# Patient Record
Sex: MALE | Race: WHITE | NOT HISPANIC OR LATINO | Employment: OTHER | URBAN - METROPOLITAN AREA
[De-identification: names, ages, dates, MRNs, and addresses within clinical notes are randomized per-mention and may not be internally consistent; named-entity substitution may affect disease eponyms.]

---

## 2018-11-30 ENCOUNTER — OFFICE VISIT (OUTPATIENT)
Dept: FAMILY MEDICINE CLINIC | Facility: CLINIC | Age: 73
End: 2018-11-30

## 2018-11-30 VITALS
HEIGHT: 68 IN | OXYGEN SATURATION: 94 % | WEIGHT: 156.7 LBS | RESPIRATION RATE: 20 BRPM | BODY MASS INDEX: 23.75 KG/M2 | TEMPERATURE: 97.8 F | HEART RATE: 99 BPM | DIASTOLIC BLOOD PRESSURE: 86 MMHG | SYSTOLIC BLOOD PRESSURE: 158 MMHG

## 2018-11-30 DIAGNOSIS — Z23 NEED FOR VACCINATION: Primary | ICD-10-CM

## 2018-11-30 DIAGNOSIS — I82.501 CHRONIC DEEP VEIN THROMBOSIS (DVT) OF RIGHT LOWER EXTREMITY, UNSPECIFIED VEIN (HCC): ICD-10-CM

## 2018-11-30 DIAGNOSIS — I10 ESSENTIAL HYPERTENSION: ICD-10-CM

## 2018-11-30 DIAGNOSIS — E78.5 HYPERLIPIDEMIA, UNSPECIFIED HYPERLIPIDEMIA TYPE: ICD-10-CM

## 2018-11-30 PROCEDURE — 90662 IIV NO PRSV INCREASED AG IM: CPT | Performed by: NURSE PRACTITIONER

## 2018-11-30 PROCEDURE — 99204 OFFICE O/P NEW MOD 45 MIN: CPT | Performed by: NURSE PRACTITIONER

## 2018-11-30 PROCEDURE — G0008 ADMIN INFLUENZA VIRUS VAC: HCPCS | Performed by: NURSE PRACTITIONER

## 2018-11-30 RX ORDER — SIMVASTATIN 10 MG
10 TABLET ORAL DAILY
Qty: 90 TABLET | Refills: 1 | Status: SHIPPED | OUTPATIENT
Start: 2018-11-30

## 2018-11-30 RX ORDER — RIVAROXABAN 20 MG/1
TABLET, FILM COATED ORAL
COMMUNITY
Start: 2018-09-23 | End: 2018-11-30 | Stop reason: SDUPTHER

## 2018-11-30 RX ORDER — SIMVASTATIN 10 MG
10 TABLET ORAL DAILY
COMMUNITY
End: 2018-11-30 | Stop reason: SDUPTHER

## 2018-11-30 RX ORDER — FLUTICASONE PROPIONATE 50 MCG
SPRAY, SUSPENSION (ML) NASAL
COMMUNITY
Start: 2018-11-16

## 2018-11-30 RX ORDER — PREDNISONE 10 MG/1
TABLET ORAL
COMMUNITY
Start: 2018-11-20 | End: 2018-11-30

## 2018-11-30 RX ORDER — RIVAROXABAN 20 MG/1
20 TABLET, FILM COATED ORAL
Qty: 90 TABLET | Refills: 1 | Status: SHIPPED | OUTPATIENT
Start: 2018-11-30

## 2018-11-30 RX ORDER — DOXYCYCLINE 100 MG/1
TABLET ORAL
COMMUNITY
Start: 2018-11-16 | End: 2018-11-30

## 2018-11-30 RX ORDER — AMLODIPINE BESYLATE AND BENAZEPRIL HYDROCHLORIDE 5; 20 MG/1; MG/1
1 CAPSULE ORAL DAILY
Qty: 90 CAPSULE | Refills: 1 | Status: SHIPPED | OUTPATIENT
Start: 2018-11-30

## 2018-11-30 RX ORDER — AMLODIPINE BESYLATE AND BENAZEPRIL HYDROCHLORIDE 5; 20 MG/1; MG/1
CAPSULE ORAL
COMMUNITY
Start: 2018-09-17 | End: 2018-11-30 | Stop reason: SDUPTHER

## 2018-11-30 NOTE — PROGRESS NOTES
Subjective   Alexey Echols is a 73 y.o. male.     Chief Complaint   Patient presents with   • Hyperlipidemia     NP, needs medication refills   • Hypertension   • Deep Vein Thrombosis     Mr. Echols presents today to establish care at this practice. He has a history of asthma, hypertension, hyperlipidemia and DVT (since 2017). He is currently taking Lotrel, Advair, Simvastatin, and Xarelto. He will need his medications refilled. He has no concerns today.     I have reviewed the patient's medical history in detail and updated the computerized patient record.    The following portions of the patient's history were reviewed and updated as appropriate: allergies, current medications, past family history, past medical history, past social history, past surgical history and problem list.       Current Outpatient Medications:   •  amLODIPine-benazepril (LOTREL 5-20) 5-20 MG per capsule, Take 1 capsule by mouth Daily., Disp: 90 capsule, Rfl: 1  •  Cholecalciferol (VITAMIN D) 1000 units tablet, Take 2,000 Units by mouth Daily., Disp: , Rfl:   •  fluticasone (FLONASE) 50 MCG/ACT nasal spray, , Disp: , Rfl:   •  fluticasone-salmeterol (ADVAIR DISKUS) 250-50 MCG/DOSE DISKUS, Inhale 1 puff 2 (Two) Times a Day., Disp: 120 each, Rfl: 1  •  simvastatin (ZOCOR) 10 MG tablet, Take 1 tablet by mouth Daily., Disp: 90 tablet, Rfl: 1  •  XARELTO 20 MG tablet, Take 1 tablet by mouth Daily With Dinner., Disp: 90 tablet, Rfl: 1    Review of Systems   Constitutional: Negative.    Respiratory: Negative.    Cardiovascular: Negative.    Musculoskeletal: Negative.    Skin: Negative.    Neurological: Negative.    All other systems reviewed and are negative.      Objective    Vitals:    11/30/18 1309   BP: 158/86   Pulse: 99   Resp: 20   Temp: 97.8 °F (36.6 °C)   SpO2: 94%     Physical Exam   Constitutional: He is oriented to person, place, and time. He appears well-developed and well-nourished.   HENT:   Head: Normocephalic.   Right Ear:  External ear normal.   Left Ear: External ear normal.   Mouth/Throat: Oropharynx is clear and moist.   Neck: Normal range of motion. Neck supple. No tracheal deviation present. No thyromegaly present.   Cardiovascular: Normal rate, regular rhythm, normal heart sounds and intact distal pulses.   Pulmonary/Chest: Effort normal and breath sounds normal.   Musculoskeletal: Normal range of motion. He exhibits no edema.   Lymphadenopathy:     He has no cervical adenopathy.   Neurological: He is alert and oriented to person, place, and time.   Skin: Skin is warm and dry.   Psychiatric:   No acute distress   Vitals reviewed.        Assessment/Plan   Alexey was seen today for hyperlipidemia, hypertension and deep vein thrombosis.    Diagnoses and all orders for this visit:    Need for vaccination  -     Flu Vaccine High Dose PF 65YR+ ([id number])    Essential hypertension    Chronic deep vein thrombosis (DVT) of right lower extremity, unspecified vein (CMS/HCC)    Hyperlipidemia, unspecified hyperlipidemia type    Other orders  -     XARELTO 20 MG tablet; Take 1 tablet by mouth Daily With Dinner.  -     amLODIPine-benazepril (LOTREL 5-20) 5-20 MG per capsule; Take 1 capsule by mouth Daily.  -     fluticasone-salmeterol (ADVAIR DISKUS) 250-50 MCG/DOSE DISKUS; Inhale 1 puff 2 (Two) Times a Day.  -     simvastatin (ZOCOR) 10 MG tablet; Take 1 tablet by mouth Daily.    1. He is to continue all medications as prescribed. I have reordered his medications for him today.  2. I have asked for his medical records from his previous PCP. I will defer from doing labs at this time until I am able to review his records.  3. Follow up as needed and in January for FRANCIE.

## 2018-12-04 RX ORDER — ALBUTEROL SULFATE 90 UG/1
2 AEROSOL, METERED RESPIRATORY (INHALATION) EVERY 4 HOURS PRN
Qty: 3 INHALER | Refills: 1 | Status: SHIPPED | OUTPATIENT
Start: 2018-12-04

## 2018-12-14 ENCOUNTER — OFFICE VISIT (OUTPATIENT)
Dept: FAMILY MEDICINE CLINIC | Facility: CLINIC | Age: 73
End: 2018-12-14

## 2018-12-14 VITALS
DIASTOLIC BLOOD PRESSURE: 86 MMHG | HEIGHT: 68 IN | HEART RATE: 106 BPM | TEMPERATURE: 98 F | BODY MASS INDEX: 23.64 KG/M2 | WEIGHT: 156 LBS | RESPIRATION RATE: 20 BRPM | OXYGEN SATURATION: 90 % | SYSTOLIC BLOOD PRESSURE: 130 MMHG

## 2018-12-14 DIAGNOSIS — J45.909 MODERATE ASTHMA WITHOUT COMPLICATION, UNSPECIFIED WHETHER PERSISTENT: Primary | ICD-10-CM

## 2018-12-14 PROCEDURE — 99214 OFFICE O/P EST MOD 30 MIN: CPT | Performed by: NURSE PRACTITIONER

## 2018-12-14 RX ORDER — METHYLPREDNISOLONE SODIUM SUCCINATE 40 MG/ML
40 INJECTION, POWDER, LYOPHILIZED, FOR SOLUTION INTRAMUSCULAR; INTRAVENOUS ONCE
Status: DISCONTINUED | OUTPATIENT
Start: 2018-12-14 | End: 2019-01-14

## 2018-12-14 RX ORDER — PREDNISONE 20 MG/1
20 TABLET ORAL 2 TIMES DAILY
Qty: 10 TABLET | Refills: 0 | Status: SHIPPED | OUTPATIENT
Start: 2018-12-14 | End: 2018-12-19

## 2018-12-14 RX ORDER — ERYTHROMYCIN 500 MG/1
500 TABLET, COATED ORAL 2 TIMES DAILY
Qty: 14 TABLET | Refills: 0 | Status: SHIPPED | OUTPATIENT
Start: 2018-12-14 | End: 2019-01-21

## 2018-12-14 NOTE — PROGRESS NOTES
Subjective   Alexey Echols is a 73 y.o. male.     Chief Complaint   Patient presents with   • Shortness of Breath     consistant      Shortness of Breath   This is a recurrent problem. The current episode started 1 to 4 weeks ago. The problem occurs constantly. The problem has been waxing and waning. Associated symptoms include rhinorrhea, sputum production and wheezing. Pertinent negatives include no fever, leg pain or leg swelling. Nothing aggravates the symptoms. Associated symptoms comments: cough. Risk factors: history of DVT. He has tried leukotriene antagonists and ipratropium inhalers for the symptoms. The treatment provided moderate relief. His past medical history is significant for asthma and DVT. There is no history of PE.    I have reviewed the patient's medical history in detail and updated the computerized patient record.    The following portions of the patient's history were reviewed and updated as appropriate: allergies, current medications, past family history, past medical history, past social history, past surgical history and problem list.       Current Outpatient Medications:   •  albuterol 108 (90 Base) MCG/ACT inhaler, Inhale 2 puffs Every 4 (Four) Hours As Needed for Wheezing., Disp: 3 inhaler, Rfl: 1  •  amLODIPine-benazepril (LOTREL 5-20) 5-20 MG per capsule, Take 1 capsule by mouth Daily., Disp: 90 capsule, Rfl: 1  •  Cholecalciferol (VITAMIN D) 1000 units tablet, Take 2,000 Units by mouth Daily., Disp: , Rfl:   •  fluticasone (FLONASE) 50 MCG/ACT nasal spray, , Disp: , Rfl:   •  fluticasone-salmeterol (ADVAIR DISKUS) 250-50 MCG/DOSE DISKUS, Inhale 1 puff 2 (Two) Times a Day., Disp: 120 each, Rfl: 1  •  simvastatin (ZOCOR) 10 MG tablet, Take 1 tablet by mouth Daily., Disp: 90 tablet, Rfl: 1  •  XARELTO 20 MG tablet, Take 1 tablet by mouth Daily With Dinner., Disp: 90 tablet, Rfl: 1    Review of Systems   Constitutional: Negative.  Negative for chills and fever.   HENT: Positive for  "rhinorrhea.    Respiratory: Positive for cough, sputum production, chest tightness (at times), shortness of breath and wheezing.    Cardiovascular: Negative.  Negative for leg swelling.   Neurological: Negative.         Vitals:    12/14/18 1343   BP: 130/86   BP Location: Left arm   Patient Position: Sitting   Cuff Size: Adult   Pulse: 106   Resp: 20   Temp: 98 °F (36.7 °C)   TempSrc: Oral   SpO2: 90%   Weight: 70.8 kg (156 lb)   Height: 171.5 cm (67.52\")       Objective   Physical Exam   Constitutional: He is oriented to person, place, and time. He appears well-developed and well-nourished.   Cardiovascular: Normal rate, regular rhythm, normal heart sounds and intact distal pulses.   Pulmonary/Chest: Effort normal. No respiratory distress. He has wheezes in the right upper field, the right middle field, the right lower field, the left upper field, the left middle field and the left lower field. He has no rales.   Neurological: He is alert and oriented to person, place, and time.   Skin: Skin is warm and dry.   Psychiatric:   No acute distress   Vitals reviewed.        Assessment/Plan   Alexey was seen today for shortness of breath.    Diagnoses and all orders for this visit:    Moderate asthma without complication, unspecified whether persistent  -     methylPREDNISolone sodium succinate (SOLU-Medrol) injection 40 mg; Inject 1 mL into the appropriate muscle as directed by prescriber 1 (One) Time.    Other orders  -     predniSONE (DELTASONE) 20 MG tablet; Take 1 tablet by mouth 2 (Two) Times a Day for 5 days.  -     erythromycin base (E-MYCIN) 500 MG tablet; Take 1 tablet by mouth 2 (Two) Times a Day.      1. He has received Solu-Medrol 40 mg IM today in the office. He is to start Prednisone 20 mg twice daily x 5 days tomorrow. He is to also start Erythromycine 500 mg twice daily for 7 days.   2. He is to continue all of this other medications. I have given him a sample of Xyzal to take nightly for his nasal " congestion.  3. Follow up as scheduled and if his symptoms do not resolve.

## 2019-01-10 DIAGNOSIS — E78.2 MIXED HYPERLIPIDEMIA: Primary | ICD-10-CM

## 2019-01-10 DIAGNOSIS — I10 ESSENTIAL HYPERTENSION: ICD-10-CM

## 2019-01-10 DIAGNOSIS — Z79.01 ON CONTINUOUS ORAL ANTICOAGULATION: ICD-10-CM

## 2019-01-13 ENCOUNTER — RESULTS ENCOUNTER (OUTPATIENT)
Dept: FAMILY MEDICINE CLINIC | Facility: CLINIC | Age: 74
End: 2019-01-13

## 2019-01-13 DIAGNOSIS — Z79.01 ON CONTINUOUS ORAL ANTICOAGULATION: ICD-10-CM

## 2019-01-13 DIAGNOSIS — E78.2 MIXED HYPERLIPIDEMIA: ICD-10-CM

## 2019-01-13 DIAGNOSIS — I10 ESSENTIAL HYPERTENSION: ICD-10-CM

## 2019-01-14 ENCOUNTER — OFFICE VISIT (OUTPATIENT)
Dept: FAMILY MEDICINE CLINIC | Facility: CLINIC | Age: 74
End: 2019-01-14

## 2019-01-14 VITALS
DIASTOLIC BLOOD PRESSURE: 68 MMHG | TEMPERATURE: 98.4 F | HEIGHT: 68 IN | WEIGHT: 157.2 LBS | RESPIRATION RATE: 20 BRPM | SYSTOLIC BLOOD PRESSURE: 110 MMHG | HEART RATE: 98 BPM | BODY MASS INDEX: 23.82 KG/M2 | OXYGEN SATURATION: 93 %

## 2019-01-14 DIAGNOSIS — J45.40 MODERATE PERSISTENT ASTHMA WITHOUT COMPLICATION: Primary | ICD-10-CM

## 2019-01-14 LAB
ALBUMIN SERPL-MCNC: 4.5 G/DL (ref 3.5–5.2)
ALBUMIN/GLOB SERPL: 1.5 G/DL
ALP SERPL-CCNC: 58 U/L (ref 39–117)
ALT SERPL-CCNC: 13 U/L (ref 1–41)
AST SERPL-CCNC: 13 U/L (ref 1–40)
BILIRUB SERPL-MCNC: 0.5 MG/DL (ref 0.1–1.2)
BUN SERPL-MCNC: 9 MG/DL (ref 8–23)
BUN/CREAT SERPL: 12.2 (ref 7–25)
CALCIUM SERPL-MCNC: 10 MG/DL (ref 8.6–10.5)
CHLORIDE SERPL-SCNC: 103 MMOL/L (ref 98–107)
CHOLEST SERPL-MCNC: 216 MG/DL (ref 0–200)
CHOLEST/HDLC SERPL: 4.24 {RATIO}
CO2 SERPL-SCNC: 25.4 MMOL/L (ref 22–29)
CREAT SERPL-MCNC: 0.74 MG/DL (ref 0.76–1.27)
ERYTHROCYTE [DISTWIDTH] IN BLOOD BY AUTOMATED COUNT: 14.2 % (ref 11.5–14.5)
GLOBULIN SER CALC-MCNC: 3 GM/DL
GLUCOSE SERPL-MCNC: 132 MG/DL (ref 65–99)
HCT VFR BLD AUTO: 50.1 % (ref 40.4–52.2)
HDLC SERPL-MCNC: 51 MG/DL (ref 40–60)
HGB BLD-MCNC: 16.7 G/DL (ref 13.7–17.6)
INR PPP: 1.22 (ref 0.9–1.1)
LDLC SERPL CALC-MCNC: 147 MG/DL (ref 0–100)
MCH RBC QN AUTO: 31.6 PG (ref 27–32.7)
MCHC RBC AUTO-ENTMCNC: 33.3 G/DL (ref 32.6–36.4)
MCV RBC AUTO: 94.9 FL (ref 79.8–96.2)
PLATELET # BLD AUTO: 216 10*3/MM3 (ref 140–500)
POTASSIUM SERPL-SCNC: 4.3 MMOL/L (ref 3.5–5.2)
PROT SERPL-MCNC: 7.5 G/DL (ref 6–8.5)
PROTHROMBIN TIME: 15.2 SECONDS (ref 11.7–14.2)
RBC # BLD AUTO: 5.28 10*6/MM3 (ref 4.6–6)
SODIUM SERPL-SCNC: 143 MMOL/L (ref 136–145)
TRIGL SERPL-MCNC: 89 MG/DL (ref 0–150)
VLDLC SERPL CALC-MCNC: 17.8 MG/DL (ref 5–40)
WBC # BLD AUTO: 5.34 10*3/MM3 (ref 4.5–10.7)

## 2019-01-14 PROCEDURE — 71046 X-RAY EXAM CHEST 2 VIEWS: CPT | Performed by: NURSE PRACTITIONER

## 2019-01-14 PROCEDURE — 99214 OFFICE O/P EST MOD 30 MIN: CPT | Performed by: NURSE PRACTITIONER

## 2019-01-14 RX ORDER — PREDNISONE 20 MG/1
20 TABLET ORAL 2 TIMES DAILY
Qty: 10 TABLET | Refills: 0 | Status: SHIPPED | OUTPATIENT
Start: 2019-01-14 | End: 2019-01-19

## 2019-01-14 NOTE — PROGRESS NOTES
Subjective   Alexey Echols is a 74 y.o. male.     Chief Complaint   Patient presents with   • Wheezing     Mr. Echols presents today with complaints of wheezing and chest tightness. He has a history of asthma and is currently taking albuterol HFA, and Advair Diskus 250-50 mcg/dose.     I have reviewed the patient's medical history in detail and updated the computerized patient record.     The following portions of the patient's history were reviewed and updated as appropriate: allergies, current medications, past family history, past medical history, past social history, past surgical history and problem list.       Current Outpatient Medications:   •  albuterol 108 (90 Base) MCG/ACT inhaler, Inhale 2 puffs Every 4 (Four) Hours As Needed for Wheezing., Disp: 3 inhaler, Rfl: 1  •  amLODIPine-benazepril (LOTREL 5-20) 5-20 MG per capsule, Take 1 capsule by mouth Daily., Disp: 90 capsule, Rfl: 1  •  Cholecalciferol (VITAMIN D) 1000 units tablet, Take 2,000 Units by mouth Daily., Disp: , Rfl:   •  erythromycin base (E-MYCIN) 500 MG tablet, Take 1 tablet by mouth 2 (Two) Times a Day., Disp: 14 tablet, Rfl: 0  •  fluticasone (FLONASE) 50 MCG/ACT nasal spray, , Disp: , Rfl:   •  fluticasone-salmeterol (ADVAIR DISKUS) 250-50 MCG/DOSE DISKUS, Inhale 1 puff 2 (Two) Times a Day., Disp: 120 each, Rfl: 1  •  simvastatin (ZOCOR) 10 MG tablet, Take 1 tablet by mouth Daily., Disp: 90 tablet, Rfl: 1  •  XARELTO 20 MG tablet, Take 1 tablet by mouth Daily With Dinner., Disp: 90 tablet, Rfl: 1  •  predniSONE (DELTASONE) 20 MG tablet, Take 1 tablet by mouth 2 (Two) Times a Day for 5 days., Disp: 10 tablet, Rfl: 0  No current facility-administered medications for this visit.     Review of Systems   Constitutional: Negative.  Negative for chills and fever.   HENT: Positive for postnasal drip. Negative for congestion and rhinorrhea.    Respiratory: Positive for chest tightness (at times), shortness of breath (at times) and wheezing.  "Negative for cough.    Skin: Negative.    Neurological: Negative.         Vitals:    01/14/19 1012   BP: 110/68   BP Location: Left arm   Patient Position: Sitting   Cuff Size: Adult   Pulse: 98   Resp: 20   Temp: 98.4 °F (36.9 °C)   TempSrc: Oral   SpO2: 93%   Weight: 71.3 kg (157 lb 3.2 oz)   Height: 171.5 cm (67.52\")       Objective   Physical Exam   Constitutional: He is oriented to person, place, and time. He appears well-developed and well-nourished.   afebrile   Neck: Neck supple. No JVD present.   Cardiovascular: Normal rate, regular rhythm and normal heart sounds.   Pulmonary/Chest: Effort normal. He has wheezes (faint throughout all fields).   Musculoskeletal: He exhibits no edema.   Neurological: He is alert and oriented to person, place, and time.   Skin: Skin is warm and dry.   Psychiatric:   No acute distress   Vitals reviewed.        Assessment/Plan   Alexey was seen today for wheezing.    Diagnoses and all orders for this visit:    Moderate persistent asthma without complication  -     predniSONE (DELTASONE) 20 MG tablet; Take 1 tablet by mouth 2 (Two) Times a Day for 5 days.    1. I have reviewed his chest x-ray and I do not see any infiltrates.   2. I will start him on Prednisone 20 mg twice daily x 5 days. He is to continue his other medications for his asthma as prescribed.   3. He is to follow up if his symptoms do not resolve so I can refer him to pulmonology.           "

## 2019-01-16 ENCOUNTER — DOCUMENTATION (OUTPATIENT)
Dept: FAMILY MEDICINE CLINIC | Facility: CLINIC | Age: 74
End: 2019-01-16

## 2019-01-16 DIAGNOSIS — R93.89 ABNORMAL FINDING ON CHEST XRAY: Primary | ICD-10-CM

## 2019-01-16 NOTE — PROGRESS NOTES
Received x-ray results from Dr. Llanos. Chest x-ray shows a nodule in the left lung base and possible scar tissue lower cardiac boarder possible scarring from old rib fracture. Recommend CT chest to follow up. I have ordered a CT of his chest and have called the patient to let him know that he needs a CT scan for further evaluation. Mr. Echols is a current smoker, has a history of asthma, is currently taking Xarelto for long term management of chronic DVT.

## 2019-01-21 ENCOUNTER — OFFICE VISIT (OUTPATIENT)
Dept: FAMILY MEDICINE CLINIC | Facility: CLINIC | Age: 74
End: 2019-01-21

## 2019-01-21 VITALS
HEIGHT: 68 IN | HEART RATE: 82 BPM | DIASTOLIC BLOOD PRESSURE: 72 MMHG | SYSTOLIC BLOOD PRESSURE: 124 MMHG | BODY MASS INDEX: 23.79 KG/M2 | TEMPERATURE: 97.7 F | WEIGHT: 157 LBS | OXYGEN SATURATION: 98 %

## 2019-01-21 DIAGNOSIS — Z12.11 ENCOUNTER FOR SCREENING FECAL OCCULT BLOOD TESTING: ICD-10-CM

## 2019-01-21 DIAGNOSIS — Z00.00 MEDICARE ANNUAL WELLNESS VISIT, SUBSEQUENT: Primary | ICD-10-CM

## 2019-01-21 PROCEDURE — G0439 PPPS, SUBSEQ VISIT: HCPCS | Performed by: NURSE PRACTITIONER

## 2019-01-21 NOTE — PATIENT INSTRUCTIONS
Medicare Wellness  Personal Prevention Plan of Service     Date of Office Visit:  2019  Encounter Provider:  GIAN Mattson  Place of Service:  [facility]  Patient Name: Alexey Echols  :  1945    As part of the Medicare Wellness portion of your visit today, we are providing you with this personalized preventive plan of services (PPPS). This plan is based upon recommendations of the [place] Preventive Services Task Force (USPSTF) and the Advisory Committee on Immunization Practices (ACIP).    This lists the preventive care services that should be considered, and provides dates of when you are due. Items listed as completed are up-to-date and do not require any further intervention.    Health Maintenance   Topic Date Due   • TDAP/TD VACCINES (1 - Tdap) 1963   • ZOSTER VACCINE (1 of 2) 1994   • PNEUMOCOCCAL VACCINES (65+ LOW/MEDIUM RISK) (1 of 2 - PCV13) 2009   • MEDICARE ANNUAL WELLNESS  2018   • AAA SCREEN (ONE-TIME)  2018   • COLONOSCOPY  2018   • LIPID PANEL  2020   • INFLUENZA VACCINE  Completed       Orders Placed This Encounter   Procedures   • Occult Blood, Fecal By Immunoassay - Stool, Per Rectum       No Follow-up on file.

## 2019-01-21 NOTE — PROGRESS NOTES
QUICK REFERENCE INFORMATION:  The ABCs of the Annual Wellness Visit    Subsequent Medicare Wellness Visit    HEALTH RISK ASSESSMENT    1945    Recent Hospitalizations:  No hospitalization(s) within the last year..        Current Medical Providers:  Patient Care Team:  Suri Strong APRN as PCP - General (Family Medicine)        Smoking Status:  Social History     Tobacco Use   Smoking Status Former Smoker   • Types: Cigarettes   Smokeless Tobacco Never Used   Tobacco Comment    quit about 24 years ago        Alcohol Consumption:  Social History     Substance and Sexual Activity   Alcohol Use Yes   • Frequency: Monthly or less       Depression Screen:   PHQ-2/PHQ-9 Depression Screening 1/21/2019   Little interest or pleasure in doing things 0   Feeling down, depressed, or hopeless 0   Total Score 0       Health Habits and Functional and Cognitive Screening:  Functional & Cognitive Status 1/21/2019   Do you have difficulty preparing food and eating? No   Do you have difficulty bathing yourself, getting dressed or grooming yourself? No   Do you have difficulty using the toilet? No   Do you have difficulty moving around from place to place? No   Do you have trouble with steps or getting out of a bed or a chair? No   In the past year have you fallen or experienced a near fall? No   Current Diet Well Balanced Diet   Dental Exam Up to date   Eye Exam Up to date   Exercise (times per week) 6 times per week   Current Exercise Activities Include Walking   Do you need help using the phone?  No   Are you deaf or do you have serious difficulty hearing?  Yes   Do you need help with transportation? No   Do you need help shopping? No   Do you need help preparing meals?  No   Do you need help with housework?  No   Do you need help with laundry? No   Do you need help taking your medications? No   Do you need help managing money? No   Do you ever drive or ride in a car without wearing a seat belt? No   Have you felt unusual  stress, anger or loneliness in the last month? No   Who do you live with? Spouse   If you need help, do you have trouble finding someone available to you? No   Have you been bothered in the last four weeks by sexual problems? No   Do you have difficulty concentrating, remembering or making decisions? No           Does the patient have evidence of cognitive impairment? No    Aspirin use counseling: Contraindicated from taking ASA      Recent Lab Results:  CMP:  Lab Results   Component Value Date     (H) 01/14/2019    BUN 9 01/14/2019    CREATININE 0.74 (L) 01/14/2019    EGFRIFNONA 103 01/14/2019    EGFRIFAFRI 125 01/14/2019    BCR 12.2 01/14/2019     01/14/2019    K 4.3 01/14/2019    CO2 25.4 01/14/2019    CALCIUM 10.0 01/14/2019    PROTENTOTREF 7.5 01/14/2019    ALBUMIN 4.50 01/14/2019    LABGLOBREF 3.0 01/14/2019    LABIL2 1.5 01/14/2019    BILITOT 0.5 01/14/2019    ALKPHOS 58 01/14/2019    AST 13 01/14/2019    ALT 13 01/14/2019     Lipid Panel:  Lab Results   Component Value Date    TRIG 89 01/14/2019    HDL 51 01/14/2019    VLDL 17.8 01/14/2019     HbA1c:       Visual Acuity:  No exam data present    Age-appropriate Screening Schedule:  Refer to the list below for future screening recommendations based on patient's age, sex and/or medical conditions. Orders for these recommended tests are listed in the plan section. The patient has been provided with a written plan.    Health Maintenance   Topic Date Due   • TDAP/TD VACCINES (1 - Tdap) 01/11/1963   • ZOSTER VACCINE (1 of 2) 01/11/1994   • PNEUMOCOCCAL VACCINES (65+ LOW/MEDIUM RISK) (1 of 2 - PCV13) 01/11/2009   • COLONOSCOPY  11/30/2018   • LIPID PANEL  01/14/2020   • INFLUENZA VACCINE  Completed        Subjective   History of Present Illness    Alexey Echols is a 74 y.o. male who presents for an Subsequent Wellness Visit.    The following portions of the patient's history were reviewed and updated as appropriate: allergies, current medications,  past family history, past medical history, past social history, past surgical history and problem list.    Outpatient Medications Prior to Visit   Medication Sig Dispense Refill   • albuterol 108 (90 Base) MCG/ACT inhaler Inhale 2 puffs Every 4 (Four) Hours As Needed for Wheezing. 3 inhaler 1   • amLODIPine-benazepril (LOTREL 5-20) 5-20 MG per capsule Take 1 capsule by mouth Daily. 90 capsule 1   • Cholecalciferol (VITAMIN D) 1000 units tablet Take 2,000 Units by mouth Daily.     • fluticasone (FLONASE) 50 MCG/ACT nasal spray      • fluticasone-salmeterol (ADVAIR DISKUS) 250-50 MCG/DOSE DISKUS Inhale 1 puff 2 (Two) Times a Day. 120 each 1   • simvastatin (ZOCOR) 10 MG tablet Take 1 tablet by mouth Daily. 90 tablet 1   • XARELTO 20 MG tablet Take 1 tablet by mouth Daily With Dinner. 90 tablet 1   • erythromycin base (E-MYCIN) 500 MG tablet Take 1 tablet by mouth 2 (Two) Times a Day. 14 tablet 0     No facility-administered medications prior to visit.        Patient Active Problem List   Diagnosis   • Essential hypertension   • Chronic deep vein thrombosis (DVT) of right lower extremity (CMS/HCC)   • Hyperlipidemia       Advance Care Planning:  thinks he has one    Identification of Risk Factors:  Risk factors include: cardiovascular risk.    Review of Systems   Constitutional: Negative.    Eyes: Negative.    Respiratory: Negative.    Cardiovascular: Negative.    Musculoskeletal: Negative.    Neurological: Negative.    Psychiatric/Behavioral: Negative.    All other systems reviewed and are negative.      Compared to one year ago, the patient feels his physical health is the same.  Compared to one year ago, the patient feels his mental health is the same.    Objective     Physical Exam   Constitutional: He is oriented to person, place, and time. He appears well-developed and well-nourished.   Neurological: He is alert and oriented to person, place, and time.   Skin: Skin is warm and dry.   Psychiatric:   No acute  "distress   Vitals reviewed.      Vitals:    19 1352   BP: 124/72   Pulse: 82   Temp: 97.7 °F (36.5 °C)   SpO2: 98%   Weight: 71.2 kg (157 lb)   Height: 171.5 cm (67.5\")       Patient's Body mass index is 24.23 kg/m². BMI is within normal parameters. No follow-up required..      Assessment/Plan   Patient Self-Management and Personalized Health Advice  The patient has been provided with information about: designing advance directives and preventive services including:   · Advance directive.    Visit Diagnoses:  No diagnosis found.    No orders of the defined types were placed in this encounter.      Outpatient Encounter Medications as of 2019   Medication Sig Dispense Refill   • albuterol 108 (90 Base) MCG/ACT inhaler Inhale 2 puffs Every 4 (Four) Hours As Needed for Wheezing. 3 inhaler 1   • amLODIPine-benazepril (LOTREL 5-20) 5-20 MG per capsule Take 1 capsule by mouth Daily. 90 capsule 1   • Cholecalciferol (VITAMIN D) 1000 units tablet Take 2,000 Units by mouth Daily.     • fluticasone (FLONASE) 50 MCG/ACT nasal spray      • fluticasone-salmeterol (ADVAIR DISKUS) 250-50 MCG/DOSE DISKUS Inhale 1 puff 2 (Two) Times a Day. 120 each 1   • [] predniSONE (DELTASONE) 20 MG tablet Take 1 tablet by mouth 2 (Two) Times a Day for 5 days. 10 tablet 0   • simvastatin (ZOCOR) 10 MG tablet Take 1 tablet by mouth Daily. 90 tablet 1   • XARELTO 20 MG tablet Take 1 tablet by mouth Daily With Dinner. 90 tablet 1   • [DISCONTINUED] erythromycin base (E-MYCIN) 500 MG tablet Take 1 tablet by mouth 2 (Two) Times a Day. 14 tablet 0     No facility-administered encounter medications on file as of 2019.        Reviewed use of high risk medication in the elderly: yes  Reviewed for potential of harmful drug interactions in the elderly: yes    Follow Up:  No Follow-up on file.     An After Visit Summary and PPPS with all of these plans were given to the patient.         "

## 2019-01-29 ENCOUNTER — APPOINTMENT (OUTPATIENT)
Dept: CT IMAGING | Facility: HOSPITAL | Age: 74
End: 2019-01-29

## 2019-02-04 ENCOUNTER — OFFICE VISIT (OUTPATIENT)
Dept: FAMILY MEDICINE CLINIC | Facility: CLINIC | Age: 74
End: 2019-02-04

## 2019-02-04 VITALS
BODY MASS INDEX: 23.79 KG/M2 | RESPIRATION RATE: 18 BRPM | SYSTOLIC BLOOD PRESSURE: 126 MMHG | HEIGHT: 68 IN | WEIGHT: 157 LBS | DIASTOLIC BLOOD PRESSURE: 74 MMHG | HEART RATE: 78 BPM | OXYGEN SATURATION: 96 % | TEMPERATURE: 98.6 F

## 2019-02-04 DIAGNOSIS — J44.9 COPD WITH CHRONIC BRONCHITIS (HCC): Primary | ICD-10-CM

## 2019-02-04 PROCEDURE — 99214 OFFICE O/P EST MOD 30 MIN: CPT | Performed by: NURSE PRACTITIONER

## 2019-02-04 RX ORDER — PREDNISONE 10 MG/1
TABLET ORAL
Qty: 1 EACH | Refills: 0 | Status: SHIPPED | OUTPATIENT
Start: 2019-02-04

## 2019-02-04 RX ORDER — CIPROFLOXACIN 500 MG/1
500 TABLET, FILM COATED ORAL 2 TIMES DAILY
Qty: 14 TABLET | Refills: 0 | Status: SHIPPED | OUTPATIENT
Start: 2019-02-04

## 2019-02-04 NOTE — PROGRESS NOTES
Subjective   Alexey Echols is a 74 y.o. male.     Chief Complaint   Patient presents with   • Shortness of Breath     Cough   This is a recurrent problem. The current episode started in the past 7 days. The problem has been gradually worsening. The problem occurs every few minutes. The cough is productive of sputum. Associated symptoms include nasal congestion, shortness of breath and wheezing. Pertinent negatives include no chest pain or heartburn. The symptoms are aggravated by lying down, cold air and other. Risk factors for lung disease include smoking/tobacco exposure. He has tried steroid inhaler and a beta-agonist inhaler for the symptoms. The treatment provided mild relief. His past medical history is significant for bronchitis and COPD.      I have reviewed the patient's medical history in detail and updated the computerized patient record.    The following portions of the patient's history were reviewed and updated as appropriate: allergies, current medications, past family history, past medical history, past social history, past surgical history and problem list.       Current Outpatient Medications:   •  albuterol 108 (90 Base) MCG/ACT inhaler, Inhale 2 puffs Every 4 (Four) Hours As Needed for Wheezing., Disp: 3 inhaler, Rfl: 1  •  amLODIPine-benazepril (LOTREL 5-20) 5-20 MG per capsule, Take 1 capsule by mouth Daily., Disp: 90 capsule, Rfl: 1  •  Cholecalciferol (VITAMIN D) 1000 units tablet, Take 2,000 Units by mouth Daily., Disp: , Rfl:   •  fluticasone (FLONASE) 50 MCG/ACT nasal spray, , Disp: , Rfl:   •  fluticasone-salmeterol (ADVAIR DISKUS) 250-50 MCG/DOSE DISKUS, Inhale 1 puff 2 (Two) Times a Day., Disp: 120 each, Rfl: 1  •  simvastatin (ZOCOR) 10 MG tablet, Take 1 tablet by mouth Daily., Disp: 90 tablet, Rfl: 1  •  XARELTO 20 MG tablet, Take 1 tablet by mouth Daily With Dinner., Disp: 90 tablet, Rfl: 1  •  ciprofloxacin (CIPRO) 500 MG tablet, Take 1 tablet by mouth 2 (Two) Times a Day., Disp:  "14 tablet, Rfl: 0  •  PredniSONE (DELTASONE) 10 MG (21) tablet pack, Use as directed on package, Disp: 1 each, Rfl: 0    Review of Systems   Constitutional: Negative.    Respiratory: Positive for cough, shortness of breath and wheezing.    Cardiovascular: Negative for chest pain.   Neurological: Negative.         Vitals:    02/04/19 1435   BP: 126/74   BP Location: Left arm   Patient Position: Sitting   Cuff Size: Adult   Pulse: 78   Resp: 18   Temp: 98.6 °F (37 °C)   TempSrc: Oral   SpO2: 96%   Weight: 71.2 kg (157 lb)   Height: 171.5 cm (67.5\")       Objective   Physical Exam   Constitutional: He is oriented to person, place, and time. He appears well-developed and well-nourished.   Cardiovascular: Normal rate, regular rhythm and normal heart sounds.   Pulmonary/Chest: Effort normal. No respiratory distress. He has decreased breath sounds. He has wheezes. He has rhonchi.   Musculoskeletal: Normal range of motion. He exhibits no edema.   Neurological: He is alert and oriented to person, place, and time.   Skin: Skin is warm and dry.   Psychiatric:   No acute distress         Assessment/Plan   Alexey was seen today for shortness of breath.    Diagnoses and all orders for this visit:    COPD with chronic bronchitis (CMS/[facility])    Other orders  -     PredniSONE (DELTASONE) 10 MG (21) tablet pack; Use as directed on package  -     ciprofloxacin (CIPRO) 500 MG tablet; Take 1 tablet by mouth 2 (Two) Times a Day.      1. He is to start Prednisone 10 mg dose pack as instructed. He is also to start Ciprofloxacin 500 mg twice a day x 7 day.  2. He is scheduled to have a CT of his chest on Friday 2/8/19.  3. We discussed that he needs to see a pulmonologist. [facility] I have suggested that he call and make an appointment [facility] and ask for a referral to pulmonology.   4. I will follow up with him once his CT is resulted.          "

## 2019-02-08 ENCOUNTER — HOSPITAL ENCOUNTER (OUTPATIENT)
Dept: CT IMAGING | Facility: HOSPITAL | Age: 74
Discharge: HOME | End: 2019-02-08
Admitting: NURSE PRACTITIONER

## 2019-02-08 PROCEDURE — 71250 CT THORAX DX C-: CPT

## 2019-02-15 ENCOUNTER — TELEPHONE (OUTPATIENT)
Dept: FAMILY MEDICINE CLINIC | Facility: CLINIC | Age: 74
End: 2019-02-15